# Patient Record
Sex: MALE | Race: BLACK OR AFRICAN AMERICAN | Employment: FULL TIME | URBAN - METROPOLITAN AREA
[De-identification: names, ages, dates, MRNs, and addresses within clinical notes are randomized per-mention and may not be internally consistent; named-entity substitution may affect disease eponyms.]

---

## 2019-04-11 ENCOUNTER — HOSPITAL ENCOUNTER (OUTPATIENT)
Dept: PHYSICAL THERAPY | Age: 34
Discharge: HOME | End: 2019-04-11
Payer: COMMERCIAL

## 2019-04-11 PROCEDURE — 97161 PT EVAL LOW COMPLEX 20 MIN: CPT

## 2019-04-11 PROCEDURE — 97110 THERAPEUTIC EXERCISES: CPT

## 2019-04-11 NOTE — PROGRESS NOTES
PT INITIAL EVALUATION NOTE 2-15    Patient Name: Jaime Márquez  Date:2019  : 1985  [x]  Patient  Verified  Payor: Molly Lazo / Plan: Avelino Argueta Se HMO / Product Type: HMO /    In time:10:45 am  Out time:11:46 am  Total Treatment Time (min): 61 minutes  Visit #: 1     Treatment Area: Right knee pain [M25.561]    SUBJECTIVE  Pain Level (0-10 scale): 610  Any medication changes, allergies to medications, adverse drug reactions, diagnosis change, or new procedure performed?: [] No    [x] Yes (see summary sheet for update)  Subjective:     Pt was referred to skilled PT for right knee pain. Today, Pt reports primary complaints of intermittent lateral>inferior right knee pain as well as knee instabilty. He has noticed intermittent \"popping\" within knee with walking or bending his knee. He denies any falls, but has \"stumbled a few times. \" Mechanism of Injury: 3/13/19: Pt was working out and was doing a hamstring curl and noted significant pain but continued to push through it. He noted lower right calf pain the next day, and significant knee swelling 2 days later and proceeded to walk a lot on it that day. MRI of Right Knee scheduled for . Aggravating factors include walking, prolonged sitting with flexed knee, prolonged standing (15 minutes). Relieving factors include knee sleeve, propping foot up with knee extended in sitting. No longer taking anti-inflammatories, or using ice. PLOF: Flag football, softball, running 3-5 miles, lifting. Previous Treatment/Compliance: Significant R ankle sprain in high school with PT Work Hx: Finance with [employer]    Living Situation: Lives in an apartment with roommate  PMHx/Surgical Hx: None. Pt Goals: \"I probably won't be coming a lot because of co-pays. \" Be mobile and play sports/run again.  They said I need to go to PT 4-6 weeks to get an MRI    OBJECTIVE/EXAMINATION  Posture:  Normal  Other Observations:  --  Gait and Functional Mobility:  R trunk lean in R stance, lateral knee pain  Palpation: TTP LCL, mild lateral joint line, posterior right knee; mild TTP right proximal gastroc    SLS:  R: Mild p! lat/inf knee    DL squats:  Anteromedial right knee pain at 80 degrees flexion    R Knee AROM 6/0/120   L Knee AROM 6/0/126     Joint Mobility Assessment: Normal patellar mobility; Pain inferior patella with superior patellar glides    Flexibility: 70 degrees R HS SLR (90 L); Mild decreased quad flexibility    LOWER QUARTER   MUSCLE STRENGTH  KEY       R  L  0 - No Contraction  Knee ext  4  5  1 - Trace            flex  4-  5  2 - Poor   Hip ext   4+  5  3 - Fair          flex   4  4  4 - Good         abd  3+  4+  5 - Normal         add  NT  NT      Ankle DF  5  5                PF  5  5                INV  5  5                EV  5  5    Neurological: Reflexes / Sensations: NT  Special Tests: Trendelenberg: (-)    Robert's: (+)       Abhinav: Mild (+) R quad   Hayley: (-)                 H.S. SLR: (+) R 70    Thessaly's: (+)      Knee Varus: (+) mild laxity/pain  Knee valgus: (-)      Knee Lachmans: (-)    Anterior/Posterior drawer: (-)    12 min Therapeutic Exercise:  [] See flow sheet :   Rationale: increase ROM and increase strength to improve the patients ability to return to PLOF with decreased pain. With   [x] TE   [] TA   [] neuro   [] other: Patient Education: [x] Review HEP    [] Progressed/Changed HEP based on:   [] positioning   [] body mechanics   [] transfers   [] heat/ice application    [x] other: Educated Pt regarding impairments, role of PT, and POC.          Other Objective/Functional Measures:  FOTO Score: 43/100    Pain Level (0-10 scale) post treatment: 6/10      ASSESSMENT:      [x]  See Plan of [address] 4/11/2019

## 2019-04-11 NOTE — PROGRESS NOTES
[facility]  [address] [address]  Phone: [phone]  Fax: [phone]    Plan of Care/Statement of Necessity for Physical Therapy Services  2-15    Patient name: Randa Reaves  : 1985  Provider#: [id number]  Referral source: Tadeo Oshea NP      Medical/Treatment Diagnosis: Right knee pain [M25.561]     Prior Hospitalization: see medical history     Comorbidities: Prior surgery  Prior Level of Function: Patient completed 20 minutes of exercise at least 3 times a week. Medications: Verified on Patient Summary List    Start of Care: 19      Onset Date: 3/13/19       The Plan of Care and following information is based on the information from the initial evaluation. Assessment/ key information: Pt is a very pleasant and motivated 28year old male who was referred to skilled PT for right knee pain. He reports primary complaints of  intermittent lateral>inferior right knee pain as well as knee instabilty. Based on examination, patient presents with symptoms indicative of a grade II sprain of his LCL and lateral meniscus tear. We will initiate his therapy program at 1x/week frequency due to co-pay, per Pt preference.      Evaluation Complexity History LOW Complexity : Zero comorbidities / personal factors that will impact the outcome / POC; Examination HIGH Complexity : 4+ Standardized tests and measures addressing body structure, function, activity limitation and / or participation in recreation  ;Presentation LOW Complexity : Stable, uncomplicated  ;Clinical Decision Making MEDIUM Complexity : FOTO score of 26-74  Overall Complexity Rating: LOW     Problem List: pain affecting function, decrease ROM, decrease strength, edema affecting function, impaired gait/ balance, decrease ADL/ functional abilitiies and decrease flexibility/ joint mobility   Treatment Plan may include any combination of the following: Therapeutic exercise, Therapeutic activities, Neuromuscular re-education, Physical agent/modality, Gait/balance training, Manual therapy, Patient education and Functional mobility training  Patient / Family readiness to learn indicated by: asking questions, trying to perform skills and interest  Persons(s) to be included in education: patient (P)  Barriers to Learning/Limitations: None  Patient Goal (s): To be mobile and play sports/run again.   Patient Self Reported Health Status: good  Rehabilitation Potential: good    Short and Long Term Goals: To be accomplished in 12 treatments:   1. Pt will be independent and compliant with HEP. 2. Pt will improve FOTO score by the MCID from 43/100 to 68/100 demonstrating improved overall function with decreased pain or discomfort. 3. Pt will be able to stand >/=60 consecutive minutes without complaints of right knee pain. 4. Pt will be able to perform full squats (>/=90 degrees knee flexion) without complaints of right knee pain. 5. Pt will demonstrate >/= 4/5 right hip abduction strength to minimize stress on knee. 6. Pt will report being able to ambulate without complaints of right knee pain or instability. 7. Pt will return to playing sports without restriction or complaints of knee pain/instability  Frequency / Duration: Patient to be seen 1-2 times per week for 12 treatments. Patient/ Caregiver education and instruction: self care, activity modification and exercises    [x]  Plan of care has been reviewed with YOSELYN Peraza 4/11/2019     ________________________________________________________________________    I certify that the above Therapy Services are being furnished while the patient is under my care. I agree with the treatment plan and certify that this therapy is necessary.     [de-identified] Signature:____________________  Date:____________Time: _________

## 2019-04-17 ENCOUNTER — HOSPITAL ENCOUNTER (OUTPATIENT)
Dept: PHYSICAL THERAPY | Age: 34
Discharge: HOME | End: 2019-04-17
Payer: COMMERCIAL

## 2019-04-17 PROCEDURE — 97016 VASOPNEUMATIC DEVICE THERAPY: CPT

## 2019-04-17 PROCEDURE — 97110 THERAPEUTIC EXERCISES: CPT

## 2019-04-17 NOTE — PROGRESS NOTES
PT DAILY TREATMENT NOTE - [facility] 2-15    Patient Name: Elzbieta Littlejohn  Date:2019  : 1985  [x]  Patient  Verified  Payor: Edith Palacios / Plan: Avelino Argueta Se HMO / Product Type: HMO /    In time: 8:03A  Out time: 9:07A  Total Treatment Time (min): 64  Total Timed Codes (min): 54  1:1 Treatment Time ( only): --   Visit #:  2    Treatment Area: Right knee pain [M25.561]    SUBJECTIVE  Pain Level (0-10 scale): 3/10  Any medication changes, allergies to medications, adverse drug reactions, diagnosis change, or new procedure performed?: [x] No    [] Yes (see summary sheet for update)  Subjective functional status/changes:   [] No changes reported  Pt reported that his knee has been sore the last few days. Pt reported pain reaching 6/10 while just standing talking with his supervisor yesterday. He reported that he had to sit down immediately. Pt reports trouble sleeping at night despite putting a pillow between his knees when laying on his side.      OBJECTIVE    Modality rationale: decrease edema, decrease inflammation and decrease pain to improve the patients ability to decrease R knee pain   Min Type Additional Details       [] Estim: []Att   []Unatt    []TENS instruct                  []IFC  []Premod   []NMES                     []Other:  []w/US   []w/ice   []w/heat  Position:  Location:       []  Traction: [] Cervical       []Lumbar                       [] Prone          []Supine                       []Intermittent   []Continuous Lbs:  [] before manual  [] after manual  []w/heat    []  Ultrasound: []Continuous   [] Pulsed                       at: []1MHz   []3MHz Location:  W/cm2:    [] Paraffin         Location:   []w/heat    []  Ice     []  Heat  []  Ice massage Position:  Location:    []  Laser  []  Other: Position:  Location:   10 post   [x]  Vasopneumatic Device Pressure:       [] lo [x] med [] hi   Temperature: 34 with legs elevated     [x] Skin assessment post-treatment:  [x]intact []redness- no adverse reaction    []redness - adverse reaction:     54 min Therapeutic Exercise:  [x] See flow sheet :   Rationale: increase ROM, increase strength, improve coordination and improve balance to improve the patients ability to increase function and mobility          With   [] TE   [] TA   [] neuro   [] other: Patient Education: [x] Review HEP    [] Progressed/Changed HEP based on:   [] positioning   [] body mechanics   [] transfers   [] heat/ice application    [] other:      Other Objective/Functional Measures: --     Pain Level (0-10 scale) post treatment: 4/10    ASSESSMENT/Changes in Function:   Added strengthening, balance and stretching exercises today and to home program. Pt reported pain along lateral knee with SLR flexion. Pt demonstrated increase hyperextension of R knee and poor quad activation. Was given quad sets first followed by revisit of SLR flexion. Pt could perform without pain. But could only do 10 and demonstrated increased challenge with this. Still waiting on the insurance company to approve his MRI for this Friday. Pt did report he has not been icing much since initial visit. Pt encouraged to ice at home more often. Patient will continue to benefit from skilled PT services to modify and progress therapeutic interventions, address functional mobility deficits, address ROM deficits, address strength deficits, analyze and address soft tissue restrictions, analyze and cue movement patterns, analyze and modify body mechanics/ergonomics and assess and modify postural abnormalities to attain remaining goals. [x]  See Plan of Care  []  See progress note/recertification  []  See Discharge Summary         Progress towards goals / Updated goals:    Short and Long Term Goals: To be accomplished in 12 treatments:               1. Pt will be independent and compliant with HEP.                2. Pt will improve FOTO score by the MCID from 43/100 to 68/100 demonstrating improved overall function with decreased pain or discomfort. 3. Pt will be able to stand >/=60 consecutive minutes without complaints of right knee pain. 4. Pt will be able to perform full squats (>/=90 degrees knee flexion) without complaints of right knee pain. 5. Pt will demonstrate >/= 4/5 right hip abduction strength to minimize stress on knee. 6. Pt will report being able to ambulate without complaints of right knee pain or instability.                7. Pt will return to playing sports without restriction or complaints of knee pain/instability  Frequency / Duration: Patient to be seen 1-2 times per week for 12 treatments.       PLAN  [x]  Upgrade activities as tolerated     [x]  Continue plan of care  []  Update interventions per flow sheet       []  Discharge due to:_  []  Other:_      Andi Dallas PTA, [facility] 4/17/2019

## 2019-04-19 ENCOUNTER — HOSPITAL ENCOUNTER (OUTPATIENT)
Dept: MRI IMAGING | Age: 34
Discharge: HOME | End: 2019-04-19
Attending: NURSE PRACTITIONER
Payer: COMMERCIAL

## 2019-04-19 DIAGNOSIS — M25.561 RIGHT KNEE PAIN: ICD-10-CM

## 2019-04-19 DIAGNOSIS — M23.51 CHRONIC INSTABILITY OF RIGHT KNEE: ICD-10-CM

## 2019-04-19 PROCEDURE — 73721 MRI JNT OF LWR EXTRE W/O DYE: CPT

## 2019-04-26 ENCOUNTER — HOSPITAL ENCOUNTER (OUTPATIENT)
Dept: PHYSICAL THERAPY | Age: 34
Discharge: HOME | End: 2019-04-26
Payer: COMMERCIAL

## 2019-04-26 PROCEDURE — 97140 MANUAL THERAPY 1/> REGIONS: CPT

## 2019-04-26 PROCEDURE — 97016 VASOPNEUMATIC DEVICE THERAPY: CPT

## 2019-04-26 PROCEDURE — 97110 THERAPEUTIC EXERCISES: CPT

## 2019-04-26 NOTE — PROGRESS NOTES
PT DAILY TREATMENT NOTE - [facility] 2-15    Patient Name: Vinita Hansen  Date:2019  : 1985  [x]  Patient  Verified  Payor: Margaret Golden / Plan: Avelino Argueta Se HMO / Product Type: HMO /    In time: 7:10A  Out time: 8:30A  Total Treatment Time (min): 80  Total Timed Codes (min): 70  1:1 Treatment Time ( only): --   Visit #:  3    Treatment Area: Right knee pain [M25.561]    SUBJECTIVE  Pain Level (0-10 scale): 2/10  Any medication changes, allergies to medications, adverse drug reactions, diagnosis change, or new procedure performed?: [x] No    [] Yes (see summary sheet for update)  Subjective functional status/changes:   [] No changes reported  Pt reports he had muscle soreness after last session. He is having lateral knee pain with walking and pain behind knee cap, primarily with knee flexion. MRI results: IMPRESSION: Small partial-thickness cartilage defect in the weightbearing medial femoral condyle.     OBJECTIVE    Modality rationale: decrease edema, decrease inflammation and decrease pain to improve the patients ability to decrease R knee pain   Min Type Additional Details       [] Estim: []Att   []Unatt    []TENS instruct                  []IFC  []Premod   []NMES                     []Other:  []w/US   []w/ice   []w/heat  Position:  Location:       []  Traction: [] Cervical       []Lumbar                       [] Prone          []Supine                       []Intermittent   []Continuous Lbs:  [] before manual  [] after manual  []w/heat    []  Ultrasound: []Continuous   [] Pulsed                       at: []1MHz   []3MHz Location:  W/cm2:    [] Paraffin         Location:   []w/heat    []  Ice     []  Heat  []  Ice massage Position:  Location:    []  Laser  []  Other: Position:  Location:   10 post   [x]  Vasopneumatic Device Pressure:       [] lo [x] med [] hi   Temperature: 34 with legs elevated     [x] Skin assessment post-treatment:  [x]intact []redness- no adverse reaction    []redness - adverse reaction:     60 min Therapeutic Exercise:  [x] See flow sheet :   Rationale: increase ROM, increase strength, improve coordination and improve balance to improve the patients ability to increase function and mobility. 10 min Manual Therapy: Patellar mobs; Mares taping technique   Rationale: decrease pain and increase tissue extensibility to improve the patients ability to ambulate with decreased pain. With   [x] TE   [] TA   [] neuro   [] other: Patient Education: [x] Review HEP    [] Progressed/Changed HEP based on:   [] positioning   [] body mechanics   [] transfers   [] heat/ice application    [] other:      Other Objective/Functional Measures: --     Pain Level (0-10 scale) post treatment: 0/10    ASSESSMENT/Changes in Function:   Pt noted slight decrease in pain and improved stability post-Mares taping today, utilizing ambulation as an asterisk sign. Attempted to incorporate side steps today, however, these R irritated medial knee, so modified to standing hip abduction/extension for gluteal strengthening. Patient will continue to benefit from skilled PT services to modify and progress therapeutic interventions, address functional mobility deficits, address ROM deficits, address strength deficits, analyze and address soft tissue restrictions, analyze and cue movement patterns, analyze and modify body mechanics/ergonomics and assess and modify postural abnormalities to attain remaining goals. [x]  See Plan of Care  []  See progress note/recertification  []  See Discharge Summary         Progress towards goals / Updated goals:    Short and Long Term Goals: To be accomplished in 12 treatments:               1. Pt will be independent and compliant with HEP. 2. Pt will improve FOTO score by the MCID from 43/100 to 68/100 demonstrating improved overall function with decreased pain or discomfort.                 3. Pt will be able to stand >/=60 consecutive minutes without complaints of right knee pain. 4. Pt will be able to perform full squats (>/=90 degrees knee flexion) without complaints of right knee pain. 5. Pt will demonstrate >/= 4/5 right hip abduction strength to minimize stress on knee. 6. Pt will report being able to ambulate without complaints of right knee pain or instability.                7. Pt will return to playing sports without restriction or complaints of knee pain/instability  Frequency / Duration: Patient to be seen 1-2 times per week for 12 treatments.       PLAN  [x]  Upgrade activities as tolerated     [x]  Continue plan of care  []  Update interventions per flow sheet       []  Discharge due to:_  []  Other:_      Enedelia Andres PT, DPT 4/26/2019

## 2019-05-22 NOTE — ANCILLARY DISCHARGE INSTRUCTIONS
[facility]  [address] [address], [address] [place] [address]  Phone: [phone]  Fax: [phone]    Discharge Summary  2-15    Patient name: Jil Mcfadden  : 1985  Provider#: [id number]  Referral source: Vandana Thompson NP      Medical/Treatment Diagnosis: Right knee pain [M25.561]     Prior Hospitalization: see medical history     Comorbidities: Prior surgery  Prior Level of Function: Patient completed 20 minutes of exercise at least 3 times a week. Medications: Verified on Patient Summary List     Start of Care: 19                                                                     Onset Date: 3/13/19        Visits from Start of Care: 3     Missed Visits: 3  Reporting Period : 19 to 19    ASSESSMENT/SUMMARY OF CARE: Mr. Hina Hirsch was seen for a total of 3 skilled physical therapy visits secondary to right knee pain. Pt reported significantly decreased pain upon his last session, though was still experiencing intermittent pain with prolonged walking or activity. He did not schedule additional appointments due to co-pay, and is thus being discharged today, 19. Final objective data and outcomes were unable to be obtained.       RECOMMENDATIONS:  [x]Discontinue therapy: []Patient has reached or is progressing toward set goals      [x]Patient is non-compliant or has abdicated      []Due to lack of appreciable progress towards set [address] [facility] 2019

## 2020-10-09 ENCOUNTER — HOSPITAL ENCOUNTER (OUTPATIENT)
Dept: PHYSICAL THERAPY | Age: 35
Discharge: HOME | End: 2020-10-09
Payer: COMMERCIAL

## 2020-10-09 PROCEDURE — 97016 VASOPNEUMATIC DEVICE THERAPY: CPT | Performed by: PHYSICAL THERAPIST

## 2020-10-09 PROCEDURE — 97161 PT EVAL LOW COMPLEX 20 MIN: CPT | Performed by: PHYSICAL THERAPIST

## 2020-10-09 PROCEDURE — 97110 THERAPEUTIC EXERCISES: CPT | Performed by: PHYSICAL THERAPIST

## 2020-10-09 NOTE — PROGRESS NOTES
PT INITIAL EVALUATION NOTE 2-15    Patient Name: Wilfrid Giordano  RXFO:  : 1985  [x]  Patient  Verified  Payor: Delgado Confer / Plan: Avelino Argueta Se HMO / Product Type: HMO /    In time:1000a  Out time:1130a  Total Treatment Time (min): 40  Visit #: 1     Treatment Area: Left knee pain [M25.562]    SUBJECTIVE  Pain Level (0-10 scale): 0  Any medication changes, allergies to medications, adverse drug reactions, diagnosis change, or new procedure performed?: [] No    [x] Yes (see summary sheet for update)  Subjective:     Patient is a 38 y.o. male 10 days s/p knee scope and microfracture in L Knee. He is under TTWB restrictions and ambulates using andrew axillary crutches. He is currently working from home. He does not complain of pain today but does experience a pulling sensation in the front of his knee in flexion. PLOF: Running, participating in team sports. Mechanism of Injury: Sx  Previous Treatment/Compliance: Seen 2019 for R knee pain, 4 visits. PMHx/Surgical Hx: -  Work Hx: Employed - works from home  Living Situation: Independent  Pt Goals: Wants to gain mobility and be ready to travel abroad by   Barriers: None  Motivation: Well motivated  Substance use: Unreported   FABQ Score: -  Cognition: A & O x 4        OBJECTIVE/EXAMINATION    Observations: Tolerates surgical limb in dependent position, able to lie prone. Incisions covered with no signs of drainage.   Gait and Functional Mobility: TTWB with axillary crutches         Left Knee ROM: AROM 90 degrees flexion, lacks 4 degrees of extension    Girth Measurments:  10 cm proximal to patella R 45cm L 46.5cm      Mid patella         R 38cm L 40.5cm        Flexibility: >45 degrees hamstring 90/90    MMT: NT    Neurological: Reflexes / Sensations: Intact (patient report)    Special Tests:     Li's Sign: -              Modality rationale: decrease edema, decrease inflammation and decrease pain to improve the patients ability to participate in therapeutic exercise   Min Type Additional Details    [] Estim: []Att   []Unatt        []TENS instruct                  []IFC  []Premod   []NMES                     []Other:  []w/US   []w/ice   []w/heat  Position:  Location:    []  Traction: [] Cervical       []Lumbar                       [] Prone          []Supine                       []Intermittent   []Continuous Lbs:  [] before manual  [] after manual  []w/heat    []  Ultrasound: []Continuous   [] Pulsed at:                            []1MHz   []3MHz Location:  W/cm2:    []  Paraffin         Location:  []w/heat    []  Ice     []  Heat  []  Ice massage Position:  Location:    []  Laser  []  Other: Position:  Location:   15 [x]  Vasopneumatic Device Pressure:       [] lo [x] med [] hi   Temperature: 34   [x] Skin assessment post-treatment:  [x]intact []redness- no adverse reaction    []redness  adverse reaction:     25 min Therapeutic Exercise:  [] See flow sheet :   Rationale: increase ROM and increase strength  to prepare for full weightbearing activity      - min Manual Therapy:  *   Rationale: decrease pain, increase ROM, increase tissue extensibility and decrease edema   to improve the patients ability to participate in therapeutic exercise, ADLs            With   [] TE   [] TA   [] neuro   [] other: Patient Education: [x] Review HEP    [] Progressed/Changed HEP based on:   [] positioning   [] body mechanics   [] transfers   [] heat/ice application    [] other:        Pain Level (0-10 scale) post treatment: 0      ASSESSMENT:      [x]  See Plan of Adria Rossi [address], SPT 10/9/2020

## 2020-10-09 NOTE — PROGRESS NOTES
[facility]  [address]  Phone: [phone]  Fax: [phone]    Plan of Care/Statement of Necessity for Physical Therapy Services  2-15    Patient name: Frederick Grant  : 1985  Provider#: [id number]  Referral source: Marissa Pavon MD      Medical/Treatment Diagnosis: Left knee pain [M25.562]     Prior Hospitalization: see medical history     Comorbidities: None  Prior Level of Function: Independent, participation in sport and outdoor recreation  Medications: Verified on Patient Summary List    Start of Care: 10/9/2020      Onset Date:        The Plan of Care and following information is based on the information from the initial evaluation. Assessment/ key information: Patient is a 38 y.o. male presenting 10 days s/p knee scope/microfracture. He is under TTWB weightbearing restrictions and arrived to his appointment using axillary crutches but admits to starting to put some weight on the leg. Advised patient to limit weightbearing to lightly touching foot down for balance. Patient has approximately 90 degrees of active flexion in sitting and lacks 4 degrees of extension. Patient denies any pain today and reports that sensation is intact in the surgical area. Left hip extension and abduction are 4+/5. The pt would benefit from skilled physical therapy in order to address these impairments and to return him to maximal level of function pain free.      Evaluation Complexity History LOW Complexity : Zero comorbidities / personal factors that will impact the outcome / POC; Examination LOW Complexity : 1-2 Standardized tests and measures addressing body structure, function, activity limitation and / or participation in recreation  ;Presentation LOW Complexity : Stable, uncomplicated  ;Clinical Decision Making MEDIUM Complexity : FOTO score of 26-74  Overall Complexity Rating: LOW     Problem List: decrease ROM, decrease strength, edema affecting function, decrease ADL/ functional abilitiies, decrease activity tolerance and decrease flexibility/ joint mobility   Treatment Plan may include any combination of the following: Therapeutic exercise, Neuromuscular re-education, Physical agent/modality, Gait/balance training, Manual therapy and Patient education  Patient / Family readiness to learn indicated by: trying to perform skills and interest  Persons(s) to be included in education: patient (P)  Barriers to Learning/Limitations: None  Patient Goal (s): Want to gain mobility and get back to activities  Patient Self Reported Health Status: good  Rehabilitation Potential: good    Short Term Goals: To be accomplished in 4 weeks:   Patient will achieve full knee extension ROM   Patient will be able to perform 3x10 straight leg raises to improve functional quad activation   Patient will maintain a 0-1/10 pain rating each session   Patient will reduce swelling of surgical side to match unaffected side    Long Term Goals: To be accomplished in 8 weeks:     Patient will achieve full knee extension during gait   Patient will be able to walk 5 minutes without pain or fatigue   Patient will be able to flex knee to >/= 125 degrees pain free. Frequency / Duration: Patient to be seen 2 times per week for 8 weeks. Patient/ Caregiver education and instruction: activity modification and exercises    [x]  Plan of care has been reviewed with YOSELYN Garcia, ELICIA 10/9/2020     ________________________________________________________________________    I certify that the above Therapy Services are being furnished while the patient is under my care. I agree with the treatment plan and certify that this therapy is necessary.     [de-identified] Signature:____________________  Date:____________Time: _________

## 2020-10-20 ENCOUNTER — HOSPITAL ENCOUNTER (OUTPATIENT)
Dept: PHYSICAL THERAPY | Age: 35
Discharge: HOME | End: 2020-10-20
Payer: COMMERCIAL

## 2020-10-20 PROCEDURE — 97014 ELECTRIC STIMULATION THERAPY: CPT | Performed by: PHYSICAL THERAPIST

## 2020-10-20 PROCEDURE — 97140 MANUAL THERAPY 1/> REGIONS: CPT | Performed by: PHYSICAL THERAPIST

## 2020-10-20 PROCEDURE — 97110 THERAPEUTIC EXERCISES: CPT | Performed by: PHYSICAL THERAPIST

## 2020-10-20 NOTE — PROGRESS NOTES
PT DAILY TREATMENT NOTE 2-15    Patient Name: Nimesh Monge  ZBFU:  : 1985  [x]  Patient  Verified  Payor: Rony Level / Plan: Avelino Argueta Se HMO / Product Type: HMO /    In time:5a  Out time:1030a  Total Treatment Time (min): 75  Visit #:  2    Treatment Area: Left knee pain [M25.562]    SUBJECTIVE  Pain Level (0-10 scale): 0  Any medication changes, allergies to medications, adverse drug reactions, diagnosis change, or new procedure performed?: [x] No    [] Yes (see summary sheet for update)  Subjective functional status/changes:   [] No changes reported  Patient reports feelings of stiffness in the anterior knee, but no pain.     OBJECTIVE    Modality rationale: decrease edema, decrease inflammation and decrease pain to improve the patients ability to perform therapeutic exercise   Min Type Additional Details       [] Estim: []Att   []Unatt    []TENS instruct                  []IFC  []Premod   []NMES                     []Other:  []w/US   []w/ice   []w/heat  Position:  Location:       []  Traction: [] Cervical       []Lumbar                       [] Prone          []Supine                       []Intermittent   []Continuous Lbs:  [] before manual  [] after manual  []w/heat    []  Ultrasound: []Continuous   [] Pulsed                       at: []1MHz   []3MHz Location:  W/cm2:    [] Paraffin         Location:   []w/heat    []  Ice     []  Heat  []  Ice massage Position:  Location:    []  Laser  []  Other: Position:  Location:   15   [x]  Vasopneumatic Device Pressure:       [] lo [x] med [] hi   Temperature:      [x] Skin assessment post-treatment:  [x]intact []redness- no adverse reaction    []redness  adverse reaction:         50 min Therapeutic Exercise:  [] See flow sheet :   Rationale: increase ROM, increase strength and improve coordination to improve the patients ability to perform ADL's  2    10 min Manual Therapy:  ML, IS patellar mobs, STM to quad, HS   Rationale: decrease pain, increase ROM, increase tissue extensibility and decrease edema   to improve the patients ability to perform TE and sit comfortably for work      With   [] TE   [] TA   [] neuro   [] other: Patient Education: [x] Review HEP    [] Progressed/Changed HEP based on:   [] positioning   [] body mechanics   [] transfers   [] heat/ice application    [] other:      Other Objective/Functional Measures:      Pain Level (0-10 scale) post treatment: 0    ASSESSMENT/Changes in Function:   Patient demonstrates some extensor lag while performing SLR. Advised patients to reduce reps at home and focus on quad contraction. Patient has no soft tissue restriction into knee extension and is positioned into hyperextension with a pillow prop under the heel in supine. Focus of following sessions to be achieving greater control of quad contraction for more functional mobility in weightbearing. Patient complained of some tension in the posterior knee during supine heel slides. Introduced seated heel slides to continue to progress knee flexion ROM. Patient tolerated ankle theraband exercises into eversion, DF, PF as well as standing TKE's with no weight bearing on left side. Treatment was performed with direct supervision by Lachelle Christine, PT,  DPT. Patient will continue to benefit from skilled PT services to modify and progress therapeutic interventions, address functional mobility deficits, address ROM deficits, address strength deficits, analyze and cue movement patterns and analyze and modify body mechanics/ergonomics to attain remaining goals. []  See Plan of Care  []  See progress note/recertification  []  See Discharge Summary         Progress towards goals / Updated goals:  Short Term Goals:  To be accomplished in 4 weeks:               Patient will achieve full knee extension ROM MET               Patient will be able to perform 3x10 straight leg raises to improve functional quad activation               Patient will maintain a 0-1/10 pain rating each session               Patient will reduce swelling of surgical side to match unaffected side     Long Term Goals: To be accomplished in 8 weeks:                  Patient will achieve full knee extension during gait               Patient will be able to walk 5 minutes without pain or fatigue               Patient will be able to flex knee to >/= 125 degrees pain free.    PLAN  [x]  Upgrade activities as tolerated     [x]  Continue plan of care  []  Update interventions per flow sheet       []  Discharge due to:_  []  Other:_      Caralyn Saliva SPT 10/20/2020

## 2020-10-23 ENCOUNTER — HOSPITAL ENCOUNTER (OUTPATIENT)
Dept: PHYSICAL THERAPY | Age: 35
Discharge: HOME | End: 2020-10-23
Payer: COMMERCIAL

## 2020-10-23 PROCEDURE — 97110 THERAPEUTIC EXERCISES: CPT | Performed by: PHYSICAL THERAPIST

## 2020-10-23 PROCEDURE — 97112 NEUROMUSCULAR REEDUCATION: CPT | Performed by: PHYSICAL THERAPIST

## 2020-10-23 PROCEDURE — 97016 VASOPNEUMATIC DEVICE THERAPY: CPT | Performed by: PHYSICAL THERAPIST

## 2020-10-23 NOTE — PROGRESS NOTES
PT DAILY TREATMENT NOTE 2-15    Patient Name: Paul Hernandez  GQ:  : 1985  [x]  Patient  Verified  Payor: Danay Smith / Plan: Avelino Argueta Se HMO / Product Type: HMO /    In time:10:10a  Out time:11:12a  Total Treatment Time (min): 58  Visit #:  3    Treatment Area: Left knee pain [M25.562]    SUBJECTIVE  Pain Level (0-10 scale): 0  Any medication changes, allergies to medications, adverse drug reactions, diagnosis change, or new procedure performed?: [x] No    [] Yes (see summary sheet for update)  Subjective functional status/changes:   [] No changes reported  Patient reports not being able to complete all of his HEP. He feels that he has a bit too much to keep track of.     OBJECTIVE    Modality rationale: decrease edema, decrease inflammation and decrease pain to improve the patients ability to perform therapeutic exercise   Min Type Additional Details       [x] Estim: []Att   []Unatt    []TENS instruct                  []IFC  []Premod   []NMES                     []Other:  []w/US   []w/ice   []w/heat  Position:  Location:        []  Traction: [] Cervical       []Lumbar                       [] Prone          []Supine                       []Intermittent   []Continuous Lbs:  [] before manual  [] after manual  []w/heat    []  Ultrasound: []Continuous   [] Pulsed                       at: []1MHz   []3MHz Location:  W/cm2:    [] Paraffin         Location:   []w/heat    []  Ice     []  Heat  []  Ice massage Position:  Location:    []  Laser  []  Other: Position:  Location:   10   [x]  Vasopneumatic Device Pressure:       [] lo [x] med [x] hi   Temperature:      [x] Skin assessment post-treatment:  [x]intact []redness- no adverse reaction    []redness  adverse reaction:         42 min Therapeutic Exercise:  [] See flow sheet :   Rationale: increase ROM, increase strength and improve coordination to improve the patients ability to perform ADL's    10 min Neuromuscular Re-education:  [] See flow sheet : Russian NMES, 10/20, 60BPS, 50% DS   Rationale: Improve neuromuscular control of quadriceps muscle to improve functional mobility      - min Manual Therapy:  ML, IS patellar mobs, STM to quad, HS   Rationale: decrease pain, increase ROM, increase tissue extensibility and decrease edema   to improve the patients ability to perform TE and sit comfortably for work      With   [] TE   [] TA   [] neuro   [] other: Patient Education: [x] Review HEP    [] Progressed/Changed HEP based on:   [] positioning   [] body mechanics   [] transfers   [] heat/ice application    [] other:      Other Objective/Functional Measures: L knee flexion 118, Patellar girth 40.5 cm, 10cm superior to patella, 45cm    Pain Level (0-10 scale) post treatment: 0    ASSESSMENT/Changes in Function:   Patient tolerated 20 rounds of NMES to and achieved a strong quad contraction, able to perform straight leg raises without extensor during treatment. This is the final visit prior to discharge. Addressed patient's remaining concerns and discussed progression of HEP. Patient will receive  physical therapy from a provider closer to where he lives. Treatment was performed with direct supervision by Leidy Hidalgo, PT,  DPT. Patient will continue to benefit from skilled PT services to modify and progress therapeutic interventions, address functional mobility deficits, address ROM deficits, address strength deficits, analyze and cue movement patterns and analyze and modify body mechanics/ergonomics to attain remaining goals. []  See Plan of Care  []  See progress note/recertification  []  See Discharge Summary         Progress towards goals / Updated goals:  Short Term Goals:  To be accomplished in 4 weeks:               Patient will achieve full knee extension ROM MET               Patient will be able to perform 3x10 straight leg raises to improve functional quad activation Progressing               Patient will maintain a 0-1/10 pain rating each session MET               Patient will reduce swelling of surgical side to match unaffected side     Long Term Goals: To be accomplished in 8 weeks:                  Patient will achieve full knee extension during gait               Patient will be able to walk 5 minutes without pain or fatigue               Patient will be able to flex knee to >/= 125 degrees pain free.    PLAN  []  Upgrade activities as tolerated     []  Continue plan of care  []  Update interventions per flow sheet       [x]  Discharge due to: Seeking physical therapy with a provider closer to home  []  Other:_      Estephanie Gray SPT 10/23/2020

## 2020-10-23 NOTE — ANCILLARY DISCHARGE INSTRUCTIONS
[facility] [id number] [address], [address] Gurpreet Doan Phone: [phone]  Fax: [phone] Discharge Summary  2-15 Patient name: Edwin Pandey                : 1985                          Provider#: [id number] Referral source: Meenakshi Antonio MD                                                     
Medical/Treatment Diagnosis: Left knee pain [M25.562] Prior Hospitalization: see medical history Comorbidities: None Prior Level of Function: Independent, participation in sport and outdoor recreation Medications: Verified on Patient Summary List 
  
Start of Care: 10/9/2020                                                                              Onset Date: 2020 Visits from Start of Care: 3     Missed Visits: 0 Reporting Period : 10/9/2020  to 10/23/2020 Short Term Goals: To be accomplished in 4 weeks: 
             Patient will achieve full knee extension ROM MET Patient will be able to perform 3x10 straight leg raises to improve functional quad activation PROGRESSING Patient will maintain a 0-1/10 pain rating each session MET Patient will reduce swelling of surgical side to match unaffected side PROGRESSING 
  
Long Term Goals: To be accomplished in 8 weeks: 
  
             Patient will achieve full knee extension during gait Patient will be able to walk 5 minutes without pain or fatigue Patient will be able to flex knee to >/= 125 degrees pain free. PROGRESSING 
 
ASSESSMENT/SUMMARY OF CARE:  
 Patient has achieved 118 degrees of flexion and 0 degrees of extension in his knee and has initiated NWB resistance exercises at the L hip, knee, and ankle. Included repetitions of straight leg raises with NMES in today's visit to facilitate quadriceps contraction.  This is the final visit prior to discharge. Addressed patient's remaining concerns and discussed progression of HEP. RECOMMENDATIONS: 
[x]Discontinue therapy: []Patient has reached or is progressing toward set goals [x]Patient is non-compliant or has abdicated 
    []Due to lack of appreciable progress towards set goals Caralyn Saliva, SPT 10/23/2020